# Patient Record
Sex: MALE | NOT HISPANIC OR LATINO | URBAN - METROPOLITAN AREA
[De-identification: names, ages, dates, MRNs, and addresses within clinical notes are randomized per-mention and may not be internally consistent; named-entity substitution may affect disease eponyms.]

---

## 2018-06-07 NOTE — PATIENT DISCUSSION
LET US KNOW HOW WE DID  You may be receiving a patient satisfaction survey in the mail.  We would appreciate it if you could please take the time to complete, as your feedback is very important to us. We strive to make your experience exceptional and your comments help us with that goal.  We look forward to hearing from you.  Feedback is anonymous unless you choose otherwise.    PRESCRIPTION REFILLS  If you need a prescription refill prior to your next office visit, please call your pharmacy and let them know.  The pharmacy will then contact us for the refill.   Please allow 24-48 hours for the refill to be processed.  We recommend calling your pharmacy at least three (3) business days before your medication runs out.      PRESCRIPTION  FOR CONTROLLED SUBSTANCES  This clinic dispenses prescription refills for narcotics, only to the patient and only Monday - Friday; no Saturdays.        LAB AND X-RAY HOURS FOR [address] [place] [address]  Monday - Thursday 7 am - 5:30 pm  Friday 7 am - 4:30 pm      TEST RESULTS  If your physician has ordered additional laboratory or radiology testing as part of your ongoing plan of care, notification of the test results will be communicated in a timely manner once reviewed by your provider.   -  If your results are normal, you will receive a letter in the mail.   -  If there are any irregularities, you will receive a phone call from our office within 5 - 7 business days.   -  If your results are critical and require more immediate intervention, you will be contacted promptly.        Pt ed.

## 2020-03-11 ENCOUNTER — IMPORTED ENCOUNTER (OUTPATIENT)
Dept: URBAN - METROPOLITAN AREA CLINIC 1 | Facility: CLINIC | Age: 70
End: 2020-03-11

## 2020-03-11 PROBLEM — H16.143: Noted: 2020-03-11

## 2020-03-11 PROBLEM — E11.9: Noted: 2020-03-11

## 2020-03-11 PROBLEM — H04.123: Noted: 2020-03-11

## 2020-03-11 PROBLEM — Z79.84: Noted: 2020-03-11

## 2020-03-11 PROBLEM — H25.813: Noted: 2020-03-11

## 2020-03-11 PROCEDURE — 92015 DETERMINE REFRACTIVE STATE: CPT

## 2020-03-11 PROCEDURE — 92004 COMPRE OPH EXAM NEW PT 1/>: CPT

## 2020-03-11 NOTE — PATIENT DISCUSSION
Cataract OU w/ PSC component --  Visually Significant OU secondary to Glare discussed the risks benefits alternatives and limitations of cataract surgery. The patient stated a full understanding and a desire to proceed with the procedure. The patient will need to return for preop appointment with cataract measurements and to have any additional questions answered and start pre-operative eye drops as directed. *PMG did not see today. Phaco PCL OS then ODOtherwise follow-up

## 2020-03-11 NOTE — PATIENT DISCUSSION
1.  DM Type II (Oral Med) without sign of diabetic retinopathy and no blot heme on dilated retinal examination today OU No Macular Edema however poor contrast w/ today's dilated exam today secondary to [facility] OU. Discussed the pathophysiology of diabetes and its effect on the eye and risk of blindness. Stressed the importance of strong glucose control. Advised of importance of at least yearly dilated examinations but to contact us immediately for any problems or concerns. 2. Cataract OU w/ PSC component --  Visually Significant OU secondary to Glare discussed the risks benefits alternatives and limitations of cataract surgery. The patient stated a full understanding and a desire to proceed with the procedure. The patient will need to return for preop appointment with cataract measurements and to have any additional questions answered and start pre-operative eye drops as directed. *PMG did not see today. Phaco PCL OS then OD3. IFEOMA w/ PEK -- Recommend the use of ATs BID OU. *DMV Form completed today. See attachments* Return for an appointment 10/Ascan/HP with Dr. Lm Sheth.

## 2022-04-02 ASSESSMENT — TONOMETRY
OD_IOP_MMHG: 16
OS_IOP_MMHG: 17

## 2022-04-02 ASSESSMENT — VISUAL ACUITY
OS_SC: J5
OD_SC: J5
OS_CC: 20/30
OD_CC: 20/30
OS_GLARE: 20/100
OD_GLARE: 20/100

## 2023-10-25 ENCOUNTER — COMPREHENSIVE EXAM (OUTPATIENT)
Dept: URBAN - METROPOLITAN AREA CLINIC 1 | Facility: CLINIC | Age: 73
End: 2023-10-25

## 2023-10-25 DIAGNOSIS — H04.123: ICD-10-CM

## 2023-10-25 DIAGNOSIS — H16.143: ICD-10-CM

## 2023-10-25 DIAGNOSIS — E11.9: ICD-10-CM

## 2023-10-25 DIAGNOSIS — H25.813: ICD-10-CM

## 2023-10-25 PROCEDURE — 99214 OFFICE O/P EST MOD 30 MIN: CPT

## 2023-10-25 PROCEDURE — 92015 DETERMINE REFRACTIVE STATE: CPT

## 2023-10-25 ASSESSMENT — VISUAL ACUITY
OD_SC: 20/50
OS_SC: 20/40
OD_SC: J7
OS_SC: J3
OS_BAT: 20/100
OD_BAT: 20/100

## 2023-10-25 ASSESSMENT — TONOMETRY
OD_IOP_MMHG: 17
OS_IOP_MMHG: 17

## 2023-11-07 ENCOUNTER — PRE-OP/H&P (OUTPATIENT)
Dept: URBAN - METROPOLITAN AREA CLINIC 1 | Facility: CLINIC | Age: 73
End: 2023-11-07

## 2023-11-07 VITALS
BODY MASS INDEX: 32.47 KG/M2 | HEART RATE: 63 BPM | WEIGHT: 245 LBS | HEIGHT: 73 IN | SYSTOLIC BLOOD PRESSURE: 181 MMHG | DIASTOLIC BLOOD PRESSURE: 101 MMHG

## 2023-11-07 DIAGNOSIS — H25.813: ICD-10-CM

## 2023-11-07 PROCEDURE — 99213 OFFICE O/P EST LOW 20 MIN: CPT

## 2023-11-07 PROCEDURE — 92136 OPHTHALMIC BIOMETRY: CPT

## 2023-11-07 PROCEDURE — 92025 CPTRIZED CORNEAL TOPOGRAPHY: CPT

## 2023-11-07 ASSESSMENT — VISUAL ACUITY
OD_SC: 20/50
OS_SC: J3
OD_BAT: 20/100
OS_BAT: 20/100
OS_SC: 20/40
OD_SC: J7

## 2023-11-29 ENCOUNTER — SURGERY/PROCEDURE (OUTPATIENT)
Dept: URBAN - METROPOLITAN AREA SURGERY 1 | Facility: SURGERY | Age: 73
End: 2023-11-29

## 2023-11-29 DIAGNOSIS — H25.812: ICD-10-CM

## 2023-11-29 PROCEDURE — 68841 INSJ RX ELUT IMPLT LAC CANAL: CPT

## 2023-11-29 PROCEDURE — 66984 XCAPSL CTRC RMVL W/O ECP: CPT

## 2023-11-30 ENCOUNTER — POST-OP (OUTPATIENT)
Dept: URBAN - METROPOLITAN AREA CLINIC 1 | Facility: CLINIC | Age: 73
End: 2023-11-30

## 2023-11-30 DIAGNOSIS — Z96.1: ICD-10-CM

## 2023-11-30 PROCEDURE — 99024 POSTOP FOLLOW-UP VISIT: CPT

## 2023-11-30 ASSESSMENT — VISUAL ACUITY: OS_SC: 20/25-2

## 2023-11-30 ASSESSMENT — TONOMETRY: OS_IOP_MMHG: 23

## 2023-12-05 ENCOUNTER — POST OP/EVAL OF SECOND EYE (OUTPATIENT)
Dept: URBAN - METROPOLITAN AREA CLINIC 1 | Facility: CLINIC | Age: 73
End: 2023-12-05

## 2023-12-05 VITALS
BODY MASS INDEX: 32.47 KG/M2 | SYSTOLIC BLOOD PRESSURE: 180 MMHG | WEIGHT: 245 LBS | DIASTOLIC BLOOD PRESSURE: 125 MMHG | HEART RATE: 70 BPM | HEIGHT: 73 IN

## 2023-12-05 DIAGNOSIS — Z96.1: ICD-10-CM

## 2023-12-05 DIAGNOSIS — H25.811: ICD-10-CM

## 2023-12-05 PROCEDURE — 99024 POSTOP FOLLOW-UP VISIT: CPT

## 2023-12-05 PROCEDURE — 92136 OPHTHALMIC BIOMETRY: CPT

## 2023-12-05 PROCEDURE — 92025 CPTRIZED CORNEAL TOPOGRAPHY: CPT

## 2023-12-05 ASSESSMENT — VISUAL ACUITY
OD_SC: 20/50-2
OD_BAT: 20/100
OS_SC: 20/20-1

## 2023-12-05 ASSESSMENT — TONOMETRY
OD_IOP_MMHG: 18
OS_IOP_MMHG: 22

## 2023-12-13 ENCOUNTER — SURGERY/PROCEDURE (OUTPATIENT)
Dept: URBAN - METROPOLITAN AREA SURGERY 1 | Facility: SURGERY | Age: 73
End: 2023-12-13

## 2023-12-13 DIAGNOSIS — H25.811: ICD-10-CM

## 2023-12-13 PROCEDURE — 68841 INSJ RX ELUT IMPLT LAC CANAL: CPT

## 2023-12-13 PROCEDURE — 66984 XCAPSL CTRC RMVL W/O ECP: CPT

## 2023-12-14 ENCOUNTER — POST-OP (OUTPATIENT)
Dept: URBAN - METROPOLITAN AREA CLINIC 1 | Facility: CLINIC | Age: 73
End: 2023-12-14

## 2023-12-14 DIAGNOSIS — Z96.1: ICD-10-CM

## 2023-12-14 PROCEDURE — 99024 POSTOP FOLLOW-UP VISIT: CPT

## 2023-12-14 ASSESSMENT — VISUAL ACUITY
OD_SC: 20/25
OS_SC: 20/25

## 2023-12-14 ASSESSMENT — TONOMETRY
OD_IOP_MMHG: 19
OS_IOP_MMHG: 19

## 2024-01-08 ENCOUNTER — POST-OP (OUTPATIENT)
Dept: URBAN - METROPOLITAN AREA CLINIC 1 | Facility: CLINIC | Age: 74
End: 2024-01-08

## 2024-01-08 DIAGNOSIS — Z96.1: ICD-10-CM

## 2024-01-08 PROCEDURE — 99024 POSTOP FOLLOW-UP VISIT: CPT

## 2024-01-08 ASSESSMENT — VISUAL ACUITY
OS_SC: J10
OS_SC: 20/20
OD_SC: 20/25+1
OD_SC: J10

## 2024-01-08 ASSESSMENT — TONOMETRY
OD_IOP_MMHG: 11
OS_IOP_MMHG: 11

## 2024-10-10 ENCOUNTER — COMPREHENSIVE EXAM (OUTPATIENT)
Dept: URBAN - METROPOLITAN AREA CLINIC 1 | Facility: CLINIC | Age: 74
End: 2024-10-10

## 2024-10-10 DIAGNOSIS — H04.123: ICD-10-CM

## 2024-10-10 DIAGNOSIS — H11.153: ICD-10-CM

## 2024-10-10 DIAGNOSIS — H26.493: ICD-10-CM

## 2024-10-10 DIAGNOSIS — H16.143: ICD-10-CM

## 2024-10-10 DIAGNOSIS — E11.9: ICD-10-CM

## 2024-10-10 PROCEDURE — 99214 OFFICE O/P EST MOD 30 MIN: CPT
